# Patient Record
Sex: MALE | Race: WHITE | Employment: OTHER | URBAN - METROPOLITAN AREA
[De-identification: names, ages, dates, MRNs, and addresses within clinical notes are randomized per-mention and may not be internally consistent; named-entity substitution may affect disease eponyms.]

---

## 2017-01-24 ENCOUNTER — OFFICE VISIT (OUTPATIENT)
Dept: PODIATRY | Facility: CLINIC | Age: 82
End: 2017-01-24
Payer: COMMERCIAL

## 2017-01-24 VITALS — BODY MASS INDEX: 27.31 KG/M2 | HEART RATE: 60 BPM | WEIGHT: 174 LBS | HEIGHT: 67 IN

## 2017-01-24 DIAGNOSIS — E11.51 TYPE II DIABETES MELLITUS WITH PERIPHERAL ARTERY DISEASE (H): ICD-10-CM

## 2017-01-24 DIAGNOSIS — B35.1 DERMATOPHYTOSIS OF NAIL: Primary | ICD-10-CM

## 2017-01-24 DIAGNOSIS — L84 CORNS AND CALLOSITIES: ICD-10-CM

## 2017-01-24 PROCEDURE — 11056 PARNG/CUTG B9 HYPRKR LES 2-4: CPT | Mod: Q8 | Performed by: PODIATRIST

## 2017-01-24 PROCEDURE — 11721 DEBRIDE NAIL 6 OR MORE: CPT | Mod: Q8 | Performed by: PODIATRIST

## 2017-01-24 NOTE — MR AVS SNAPSHOT
After Visit Summary   1/24/2017    David Smalls    MRN: [id number]           Patient Information     Date Of Birth          9/3/1931        Visit Information        Provider Department      1/24/2017 1:45 PM Daniel Montero, DPM [facility] [place]        Today's Diagnoses     Dermatophytosis of nail    -  1     Corns and callosities         Type II diabetes mellitus with peripheral artery disease (H)           Care Instructions    We wish you continued good healing. If you have any questions or concerns, please do not hesitate to contact us at [phone].      Please remember to call and schedule a follow up appointment if one was recommended at your earliest convenience.   PODIATRY CLINIC HOURS  TELEPHONE NUMBER    Dr. Daniel FLORENCEPRENETTA FAC FAS    Clinics:  [facility]        Raquel [place] MA  Medical Assistant  Tuesday 1PM-6PM  [place]  Wednesday 7AM-2PM  [place]  Thursday 10AM-6PM  [place]  Friday 7AM-345PM  [place]  Specialty schedulers:   [phone] to make an appointment with any Specialty Provider.        Urgent Care locations:    [facility] Monday-Friday 5 pm - 9 pm. Saturday-Sunday 9 am -5pm    Monday-Friday 11 am - 9 pm Saturday 9 am - 5 pm     Monday-Sunday 12 noon-8PM [phone]     If you need a medication refill, please contact us you may need lab work and/or a follow up visit prior to your refill (i.e. Antifungal medications).    RingRangt (secure e-mail communication and access to your chart) to send a message or to make an appointment.    If MRI needed please call [facility] at [phone]        Weight management plan: Patient was referred to their PCP to discuss a diet and exercise plan.          Follow-ups after your visit        Your next 10 appointments already scheduled     Apr 04, 2017   "1:00 PM   Return Visit with Daniel Montero DPM   [facility] [place] ([facility] [place])    [address]  [place] [address] [phone]              Who to contact     If you have questions or need follow up information about today's clinic visit or your schedule please contact [facility] [place] directly at [phone].  Normal or non-critical lab and imaging results will be communicated to you by Monitor110hart, letter or phone within 4 business days after the clinic has received the results. If you do not hear from us within 7 days, please contact the clinic through Monitor110hart or phone. If you have a critical or abnormal lab result, we will notify you by phone as soon as possible.  Submit refill requests through Easy Social Shop or call your pharmacy and they will forward the refill request to us. Please allow 3 business days for your refill to be completed.          Additional Information About Your Visit        Easy Social Shop Information     Easy Social Shop lets you send messages to your doctor, view your test results, renew your prescriptions, schedule appointments and more. To sign up, go to [email or web address] . Click on \"Log in\" on the left side of the screen, which will take you to the Welcome page. Then click on \"Sign up Now\" on the right side of the page.     You will be asked to enter the access code listed below, as well as some personal information. Please follow the directions to create your username and password.     Your access code is: [email or web address]  Expires: 2017  3:53 PM     Your access code will  in 90 days. If you need help or a new code, please call your [facility] clinic or [phone].        Care EveryWhere ID     This is your Care EveryWhere ID. This could be used by other organizations to access your [facility] medical records  [id number]        Your Vitals Were     Pulse Height BMI (Body Mass Index)             60 " "1.702 m (5' 7\") 27.25 kg/m2          Blood Pressure from Last 3 Encounters:   12/30/14 147/62   12/09/14 170/78   12/02/14 175/82    Weight from Last 3 Encounters:   01/24/17 78.926 kg (174 lb)   11/01/16 80.74 kg (178 lb)   08/23/16 82.555 kg (182 lb)              We Performed the Following     DEBRIDEMENT OF NAILS, 6 OR MORE     TRIM HYPERKERATOTIC SKIN LESION,2-4        Primary Care Provider Office Phone # Fax #    Baljeet Deng [phone]       [facility] [place] [address]  [place] [address]        Thank you!     Thank you for choosing [facility]  for your care. Our goal is always to provide you with excellent care. Hearing back from our patients is one way we can continue to improve our services. Please take a few minutes to complete the written survey that you may receive in the mail after your visit with us. Thank you!             Your Updated Medication List - Protect others around you: Learn how to safely use, store and throw away your medicines at www.disposemymeds.org.          This list is accurate as of: 1/24/17  3:54 PM.  Always use your most recent med list.                   Brand Name Dispense Instructions for use    ACE/ARB NOT PRESCRIBED (INTENTIONAL)      continuous prn. ACE & ARB not prescribed due to Other: [facility]N, DM [facility] Pt is not sure why he isn't taking an ACE/ARB. [facility].       aspirin 325 MG EC tablet      Take 1 tablet by mouth daily.       atorvastatin 40 MG tablet    LIPITOR    90 tablet    Take 1 tablet (40 mg) by mouth daily       B-12 PO      Take  by mouth daily.       clopidogrel 75 MG tablet    PLAVIX         glimepiride 4 MG tablet    AMARYL    90 tablet    Take 1 tablet (4 mg) by mouth every morning (before breakfast)       hydrochlorothiazide 25 MG tablet    HYDRODIURIL         hydrocortisone-pramoxine rectal foam    PROCTOFOAM-HC     Place 1 " Applicatorful rectally       LANTUS SOLOSTAR 100 UNIT/ML injection   Generic drug:  insulin glargine      18 Units every morning 6 units at bedtime       lisinopril 2.5 MG tablet    PRINIVIL/Zestril    90 tablet    Take 1 tablet (2.5 mg) by mouth daily       metFORMIN 1000 MG tablet    GLUCOPHAGE     1 TABLET TWICE DAILY WITH FOOD       metoprolol 100 MG 24 hr tablet    TOPROL-XL     Take 25 mg by mouth daily Per patient says he takes it twice daily       omeprazole 20 MG CR capsule    priLOSEC     1 CAPSULE DAILY       traMADol 50 MG tablet    ULTRAM         VITAMIN C PO      Take 500 mg by mouth daily       VITAMIN D3 PO      Take 1,000 Units by mouth daily

## 2017-01-24 NOTE — PATIENT INSTRUCTIONS
We wish you continued good healing. If you have any questions or concerns, please do not hesitate to contact us at [phone].      Please remember to call and schedule a follow up appointment if one was recommended at your earliest convenience.   PODIATRY CLINIC HOURS  TELEPHONE NUMBER    Dr. Dnaiel Montero D.P.M [facility]    Clinics:  [facility]        Raquel [place] MA  Medical Assistant  Tuesday 1PM-6PM  [facility]  Wednesday 7AM-2PM  [place]  Thursday 10AM-6PM  [place] Friday 7AM-345PM  [place]  Specialty schedulers:   [phone] to make an appointment with any Specialty Provider.        Urgent Care locations:    [facility] Monday-Friday 5 pm - 9 pm. Saturday-Sunday 9 am -5pm    Monday-Friday 11 am - 9 pm Saturday 9 am - 5 pm     Monday-Sunday 12 noon-8PM [phone]     If you need a medication refill, please contact us you may need lab work and/or a follow up visit prior to your refill (i.e. Antifungal medications).    Nimbithart (secure e-mail communication and access to your chart) to send a message or to make an appointment.    If MRI needed please call [facility] at [phone]        Weight management plan: Patient was referred to their PCP to discuss a diet and exercise plan.

## 2017-01-24 NOTE — PROGRESS NOTES
Subjective:    Patient is seen today as f/u pt for a diabetic foot exam.  Pt has had left leg bypass by Dr. Devi for left second toe ulcer.  Has no new complaints about feet.   Patient's primary care is Dr. Deng and was last seen on 12/2016.  Patient had right second flexor tenotomy on 12/31/14 and this has healed with no problems.         ROS : denies new foot erythema      Objective:    0/4 DP & 0/4PT b/l.  5.07 [place]-Heather wire intact on all digits bilaterally.  Skin is thin, shiny and atrophic with no hair growth bilaterally.  Calluses noted right foot sub fifth met head x 2.  Positive edema bilaterally.  Positive varicosities bilaterally.   No evidence of infection, ulceration, or drainage.   No open wounds.  All remaining nails are mycotic.         Assessment:  Diabetes Mellitus with peripheral vascular disease.  Onychomycosis  Calluses x 2       Plan:  Discussed etiology and treatment options.  Debridement of mycotic nails with .  Calluses debrided with a fifteen blade.  Discussed appropriate shoewear and diabetic foot education.   Suggested not going barefoot, checking feet once per day, not soaking excessively, and drying in-between toes.  Return to clinic prn.    Daniel Montero DPM, FACFAS

## 2017-04-04 ENCOUNTER — OFFICE VISIT (OUTPATIENT)
Dept: PODIATRY | Facility: CLINIC | Age: 82
End: 2017-04-04
Payer: COMMERCIAL

## 2017-04-04 VITALS
SYSTOLIC BLOOD PRESSURE: 160 MMHG | BODY MASS INDEX: 26.94 KG/M2 | HEART RATE: 80 BPM | DIASTOLIC BLOOD PRESSURE: 70 MMHG | WEIGHT: 172 LBS

## 2017-04-04 DIAGNOSIS — L84 CORNS AND CALLOSITIES: ICD-10-CM

## 2017-04-04 DIAGNOSIS — B35.1 DERMATOPHYTOSIS OF NAIL: Primary | ICD-10-CM

## 2017-04-04 DIAGNOSIS — E11.59 TYPE 2 DIABETES MELLITUS WITH OTHER CIRCULATORY COMPLICATIONS (H): ICD-10-CM

## 2017-04-04 PROCEDURE — 11721 DEBRIDE NAIL 6 OR MORE: CPT | Mod: 59 | Performed by: PODIATRIST

## 2017-04-04 PROCEDURE — 11056 PARNG/CUTG B9 HYPRKR LES 2-4: CPT | Mod: Q8 | Performed by: PODIATRIST

## 2017-04-04 NOTE — LETTER
4/4/2017       RE: David Smalls  [address]   [place] [address]           Dear Colleague,    Thank you for referring your patient, David Smalls, to the [facility]. Please see a copy of my visit note below.    Subjective:    Patient is seen today as f/u pt for a diabetic foot exam.  Pt has had left leg bypass by Dr. Devi for left second toe ulcer.  Has no new complaints about feet.   Patient's primary care is Dr. Deng and was last seen on 12/2016.  Patient had right second flexor tenotomy on 12/31/14 and this has healed with no problems.         ROS : denies new foot ulcers      Objective:    0/4 DP & 0/4PT b/l.  5.07 [place]-Heather wire intact on all digits bilaterally.  Skin is thin, shiny and atrophic with no hair growth bilaterally.  Calluses noted right foot sub fifth met head x 2.  Positive edema bilaterally.  Positive varicosities bilaterally.   No evidence of infection, ulceration, or drainage.   No open wounds.  All remaining nails are mycotic.         Assessment:  Diabetes Mellitus with peripheral vascular disease.  Onychomycosis  Calluses x 2       Plan:  Discussed etiology and treatment options.  Debridement of mycotic nails with .  Calluses debrided with a fifteen blade.  Discussed appropriate shoewear and diabetic foot education.   Suggested not going barefoot, checking feet once per day, not soaking excessively, and drying in-between toes.  Return to clinic prn.    Daniel Montero DPM, FACFAS        Again, thank you for allowing me to participate in the care of your patient.        Sincerely,              Daniel Montero DPM

## 2017-04-04 NOTE — MR AVS SNAPSHOT
After Visit Summary   4/4/2017    David Smalls    MRN: [id number]           Patient Information     Date Of Birth          9/3/1931        Visit Information        Provider Department      4/4/2017 1:00 PM Daniel Montero, DPM [facility] [place]        Today's Diagnoses     Dermatophytosis of nail    -  1    Corns and callosities        Type 2 diabetes mellitus with other circulatory complications (H)          Care Instructions    We wish you continued good healing. If you have any questions or concerns, please do not hesitate to contact us at [phone].      Please remember to call and schedule a follow up appointment if one was recommended at your earliest convenience.   PODIATRY CLINIC HOURS  TELEPHONE NUMBER    Dr. Daniel FLORENCEPRENETTA FAC FAS    Clinics:  [facility]        Ra[place] MA  Medical Assistant  Tuesday 1PM-6PM  [place]  Wednesday 7AM-2PM  [place]  Thursday 10AM-6PM  [place]  Friday 7AM-345PM  [place]  Specialty schedulers:   [phone] to make an appointment with any Specialty Provider.        Urgent Care locations:    [facility] Monday-Friday 5 pm - 9 pm. Saturday-Sunday 9 am -5pm    Monday-Friday 11 am - 9 pm Saturday 9 am - 5 pm     Monday-Sunday 12 noon-8PM [phone]     If you need a medication refill, please contact us you may need lab work and/or a follow up visit prior to your refill (i.e. Antifungal medications).    [employer]t (secure e-mail communication and access to your chart) to send a message or to make an appointment.    If MRI needed please call [facility] at [phone]        Weight management plan: Patient was referred to their PCP to discuss a diet and exercise plan.          Follow-ups after your visit        Your next 10 appointments already scheduled     Jul 11, 2017   "1:00 PM CDT   Return Visit with Daniel Montero DPM   [facility] [place] ([facility] [place])    [address]  [place] [address] [phone]              Who to contact     If you have questions or need follow up information about today's clinic visit or your schedule please contact [facility] [place] directly at [phone].  Normal or non-critical lab and imaging results will be communicated to you by ZootRockhart, letter or phone within 4 business days after the clinic has received the results. If you do not hear from us within 7 days, please contact the clinic through MyChart or phone. If you have a critical or abnormal lab result, we will notify you by phone as soon as possible.  Submit refill requests through Internet Mall or call your pharmacy and they will forward the refill request to us. Please allow 3 business days for your refill to be completed.          Additional Information About Your Visit        Internet Mall Information     Internet Mall lets you send messages to your doctor, view your test results, renew your prescriptions, schedule appointments and more. To sign up, go to [email or web address] . Click on \"Log in\" on the left side of the screen, which will take you to the Welcome page. Then click on \"Sign up Now\" on the right side of the page.     You will be asked to enter the access code listed below, as well as some personal information. Please follow the directions to create your username and password.     Your access code is: [email or web address]  Expires: 2017  4:53 PM     Your access code will  in 90 days. If you need help or a new code, please call your [facility] clinic or [phone].        Care EveryWhere ID     This is your Care EveryWhere ID. This could be used by other organizations to access your [facility] medical records  [id number]        Your Vitals Were     Pulse BMI (Body Mass Index)                80 " 26.94 kg/m2           Blood Pressure from Last 3 Encounters:   04/04/17 160/70   12/30/14 147/62   12/09/14 170/78    Weight from Last 3 Encounters:   04/04/17 78 kg (172 lb)   01/24/17 78.9 kg (174 lb)   11/01/16 80.7 kg (178 lb)              We Performed the Following     DEBRIDEMENT OF NAILS, 6 OR MORE     TRIM HYPERKERATOTIC SKIN LESION,2-4        Primary Care Provider Office Phone # Fax #    Baljeet Deng [phone]       [facility] [place] [address]  [place] [address]        Thank you!     Thank you for choosing [facility]  for your care. Our goal is always to provide you with excellent care. Hearing back from our patients is one way we can continue to improve our services. Please take a few minutes to complete the written survey that you may receive in the mail after your visit with us. Thank you!             Your Updated Medication List - Protect others around you: Learn how to safely use, store and throw away your medicines at www.disposemymeds.org.          This list is accurate as of: 4/4/17  1:19 PM.  Always use your most recent med list.                   Brand Name Dispense Instructions for use    ACE/ARB NOT PRESCRIBED (INTENTIONAL)      continuous prn. ACE & ARB not prescribed due to Other: HTN, DM and CAD [facility]. Pt is not sure why he isn't taking an ACE/ARB. [facility].       aspirin 325 MG EC tablet      Take 1 tablet by mouth daily.       atorvastatin 40 MG tablet    LIPITOR    90 tablet    Take 1 tablet (40 mg) by mouth daily       B-12 PO      Take  by mouth daily.       clopidogrel 75 MG tablet    PLAVIX         glimepiride 4 MG tablet    AMARYL    90 tablet    Take 1 tablet (4 mg) by mouth every morning (before breakfast)       hydrochlorothiazide 25 MG tablet    HYDRODIURIL         hydrocortisone-pramoxine rectal foam    PROCTOFOAM-HC     Place 1 Applicatorful rectally        LANTUS SOLOSTAR 100 UNIT/ML injection   Generic drug:  insulin glargine      18 Units every morning 6 units at bedtime       lisinopril 2.5 MG tablet    PRINIVIL/Zestril    90 tablet    Take 1 tablet (2.5 mg) by mouth daily       metFORMIN 1000 MG tablet    GLUCOPHAGE     1 TABLET TWICE DAILY WITH FOOD       metoprolol 100 MG 24 hr tablet    TOPROL-XL     Take 25 mg by mouth daily Per patient says he takes it twice daily       omeprazole 20 MG CR capsule    priLOSEC     1 CAPSULE DAILY       traMADol 50 MG tablet    ULTRAM         VITAMIN C PO      Take 500 mg by mouth daily       VITAMIN D3 PO      Take 1,000 Units by mouth daily

## 2017-04-04 NOTE — PATIENT INSTRUCTIONS
We wish you continued good healing. If you have any questions or concerns, please do not hesitate to contact us at [phone].      Please remember to call and schedule a follow up appointment if one was recommended at your earliest convenience.   PODIATRY CLINIC HOURS  TELEPHONE NUMBER    Dr. Daniel Montero D.P.M [facility]    Clinics:  [facility]        Raquel [place] MA  Medical Assistant  Tuesday 1PM-6PM  [facility]  Wednesday 7AM-2PM  [place]  Thursday 10AM-6PM  [place] Friday 7AM-345PM  [place]  Specialty schedulers:   [phone] to make an appointment with any Specialty Provider.        Urgent Care locations:    [facility] Monday-Friday 5 pm - 9 pm. Saturday-Sunday 9 am -5pm    Monday-Friday 11 am - 9 pm Saturday 9 am - 5 pm     Monday-Sunday 12 noon-8PM [phone]     If you need a medication refill, please contact us you may need lab work and/or a follow up visit prior to your refill (i.e. Antifungal medications).    Pogoplughart (secure e-mail communication and access to your chart) to send a message or to make an appointment.    If MRI needed please call [facility] at [phone]        Weight management plan: Patient was referred to their PCP to discuss a diet and exercise plan.

## 2017-04-04 NOTE — PROGRESS NOTES
Subjective:    Patient is seen today as f/u pt for a diabetic foot exam.  Pt has had left leg bypass by Dr. Devi for left second toe ulcer.  Has no new complaints about feet.   Patient's primary care is Dr. Deng and was last seen on 12/2016.  Patient had right second flexor tenotomy on 12/31/14 and this has healed with no problems.         ROS : denies new foot ulcers      Objective:    0/4 DP & 0/4PT b/l.  5.07 [place]-Heather wire intact on all digits bilaterally.  Skin is thin, shiny and atrophic with no hair growth bilaterally.  Calluses noted right foot sub fifth met head x 2.  Positive edema bilaterally.  Positive varicosities bilaterally.   No evidence of infection, ulceration, or drainage.   No open wounds.  All remaining nails are mycotic.         Assessment:  Diabetes Mellitus with peripheral vascular disease.  Onychomycosis  Calluses x 2       Plan:  Discussed etiology and treatment options.  Debridement of mycotic nails with .  Calluses debrided with a fifteen blade.  Discussed appropriate shoewear and diabetic foot education.   Suggested not going barefoot, checking feet once per day, not soaking excessively, and drying in-between toes.  Return to clinic prn.    Daniel Montero DPM, FACFAS

## 2017-07-10 ENCOUNTER — TELEPHONE (OUTPATIENT)
Dept: PODIATRY | Facility: CLINIC | Age: 82
End: 2017-07-10

## 2017-07-10 NOTE — TELEPHONE ENCOUNTER
Pts wife calling to cancel appointment scheduled with  on 7/11 at . She requests message be sent to -  She states David was admitted to [facility] for foot ulcer. She will call to reschedule this visit once he has been discharged. Thank You.    Stephanie Prince, [facility]  Diabetes and Nutrition  [facility]

## 2022-07-28 NOTE — NURSING NOTE
"Chief Complaint   Patient presents with     Diabetes     Toenail     Peripheral Vascular Disease       Initial /70 (BP Location: Right arm, Patient Position: Chair, Cuff Size: Adult Regular)  Pulse 80  Wt 78 kg (172 lb)  BMI 26.94 kg/m2 Estimated body mass index is 26.94 kg/(m^2) as calculated from the following:    Height as of 1/24/17: 1.702 m (5' 7\").    Weight as of this encounter: 78 kg (172 lb).  Medication Reconciliation: complete  "
Stable